# Patient Record
Sex: MALE | Race: WHITE | NOT HISPANIC OR LATINO | URBAN - METROPOLITAN AREA
[De-identification: names, ages, dates, MRNs, and addresses within clinical notes are randomized per-mention and may not be internally consistent; named-entity substitution may affect disease eponyms.]

---

## 2024-05-20 ENCOUNTER — OFFICE VISIT (OUTPATIENT)
Age: 39
End: 2024-05-20
Payer: MEDICARE

## 2024-05-20 VITALS
HEIGHT: 67 IN | HEART RATE: 57 BPM | BODY MASS INDEX: 32.02 KG/M2 | WEIGHT: 204 LBS | DIASTOLIC BLOOD PRESSURE: 56 MMHG | SYSTOLIC BLOOD PRESSURE: 131 MMHG

## 2024-05-20 DIAGNOSIS — S69.91XA INJURY OF RIGHT HAND, INITIAL ENCOUNTER: ICD-10-CM

## 2024-05-20 DIAGNOSIS — S62.316A DISPLACED FRACTURE OF BASE OF FIFTH METACARPAL BONE, RIGHT HAND, INITIAL ENCOUNTER FOR CLOSED FRACTURE: Primary | ICD-10-CM
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
## 2024-05-20 PROCEDURE — 99204 OFFICE O/P NEW MOD 45 MIN: CPT | Performed by: STUDENT IN AN ORGANIZED HEALTH CARE EDUCATION/TRAINING PROGRAM

## 2024-05-20 PROCEDURE — 26600 TREAT METACARPAL FRACTURE: CPT | Performed by: STUDENT IN AN ORGANIZED HEALTH CARE EDUCATION/TRAINING PROGRAM

## 2024-05-20 NOTE — PROGRESS NOTES
"1. Displaced fracture of base of fifth metacarpal bone, right hand, initial encounter for closed fracture  Fracture / Dislocation Treatment      2. Injury of right hand, initial encounter  Fracture / Dislocation Treatment      3. Struck by softball, initial encounter  Fracture / Dislocation Treatment        Orders Placed This Encounter   Procedures    Fracture / Dislocation Treatment        Imaging Studies (I personally reviewed images in PACS and report):    X-ray right hand 5/17/2024: There is no official report available to review but by my interpretation there appears to be a minimally displaced/angulated fifth metacarpal neck fracture.    IMPRESSION:  Acute right ulnar-sided hand pain/injury secondary to a mildly/angulated displaced fifth metacarpal neck fracture  Secondary to injury while playing softball and striking the ulnar aspect of his hand while attempting to make a catch  Currently in a splint  Date of Injury: 5/17/2024    PLAN:    Clinical exam and radiographic imaging reviewed with patient today, with impression as per above. I have discussed with the patient the pathophysiology of this diagnosis and reviewed how the examination correlates with this diagnosis.    Imaging obtained/reviewed as per above. I will follow up official radiology interpretation.  Recommended conservative treatment at this time.  Transition patient from a ulnar gutter splint to an ulnar gutter cast as per procedure note below.  Cast care discussed as per patient instructions.  Counseled he would need to be in the cast for at least 6 weeks.  In regards to pain control I counseled use of acetaminophen, NSAIDs, icing 20 minutes on/off, elevation of the affected extremity.    Return in about 5 weeks (around 6/24/2024).      Portions of the record may have been created with voice recognition software. Occasional wrong word or \"sound a like\" substitutions may have occurred due to the inherent limitations of voice recognition " software. Read the chart carefully and recognize, using context, where substitutions have occurred.     CHIEF COMPLAINT:  Chief Complaint   Patient presents with    Right Hand - Pain         HPI:  David Zazueta is a 38 y.o. male  who presents with his daughter for       Visit 5/20/2024 :  Initial evaluation of right hand fracture/injury:  Was seen at [facility] (imaging report/documents from  not available. Patient has CD available which was scanned into chart today and reviewed)    Injury Related: Yes     Onset/Mechanism: 5/17/2024.  Reportedly was participating in softball and while attempting to make a catch was struck awkwardly along the ulnar aspect of his hand causing immediate pain.  He states he was able to complete the game but had persistent pain along the ulnar aspect of his hand and eventually developed progressive swelling and difficulty with gripping due to the pain.  Location: Points to the fifth metacarpal head/neck as the site of pain  Severity: Intermittent, mild to moderate intensity.  Patient states when the splint was first applied at urgent care they did apply it tight enough that patient actually felt more discomfort and did reapply to apply less pressure along the fifth metacarpal head/neck and now the pain is more tolerable.  Pain described as: Sharp/aching  Radiation: None  Provocative: Gripping, lifting  Associated symptoms: Denies any numbness/tingling of right upper extremity     Hx fracture/injury for affected limb: Denies  Surgery hx for affected limb: Denies     Summary of treatment to-date:   Tylenol / NSAIDs   ICE / elevate   Ulnar gutter splint      Medical, Surgical, Family, and Social History    History reviewed. No pertinent past medical history.  History reviewed. No pertinent surgical history.  Social History   Social History     Substance and Sexual Activity   Alcohol Use Yes     Social History     Substance and Sexual Activity   Drug Use Never     Social History  "    Tobacco Use   Smoking Status Never   Smokeless Tobacco Never     History reviewed. No pertinent family history.  No Known Allergies       Physical Exam  /56   Pulse 57   Ht 5' 7\" (1.702 m)   Wt 92.5 kg (204 lb)   BMI 31.95 kg/m²     Constitutional:  see vital signs  Gen: well-developed, normocephalic/atraumatic, well-groomed  Eyes: No inflammation or discharge of conjunctiva or lids; sclera clear   Pharynx: no inflammation, lesion, or mass of lips  SKIN: no visible rashes or skin lesions  Pulmonary/Chest: Effort normal. No respiratory distress.     Ortho Exam  Right hand exam:  Ulnar gutter splint was removed for examination  Inspection: Skin edema along the ulnar aspect of his hand.  Faded ecchymosis.  No open wounds or drainage, erythema  Palpation: Positive tenderness of the fifth metacarpal head and neck otherwise nontender throughout the fifth metacarpal shaft or fifth digit/PIP/DIP.  Nontender over his other digits or thumb.  Nontender over the radial/ulnar aspect of his wrist  ROM: Intact ROM of his lesser digits at the MCP/PIP/DIP without malrotation.  Intact opposition of his thumb.  Wrist range of motion limited due to reported stiffness of wrist but does not exacerbate pain of hand.  Wrist extension of about 30 degrees and wrist flexion of about 45 degrees  Sensation intact in radial/median/ulnar distribution  Radial pulse 2+    Fracture / Dislocation Treatment    Date/Time: 5/20/2024 8:00 AM    Performed by: Michaela Salgado  Authorized by: Santi Schwartz MD    Patient Location:  [facility] Protocol:  Consent: Verbal consent obtained.  Risks and benefits: risks, benefits and alternatives were discussed  Consent given by: patient  Patient understanding: patient states understanding of the procedure being performed  Radiology Images displayed and confirmed. If images not available, report reviewed: imaging studies available  Required items: required blood products, implants, devices, " and special equipment available    Injury location:  Hand  Location details:  Right hand  Injury type:  Fracture  Fracture type: fifth metacarpal    Distal perfusion: normal    Neurological function: normal    Range of motion: reduced    Immobilization:  Cast  Cast type: Ulnar gutter short arm cast.  Distal perfusion: normal    Neurological function: normal    Patient tolerance:  Patient tolerated the procedure well with no immediate complications

## 2024-06-27 ENCOUNTER — APPOINTMENT (OUTPATIENT)
Age: 39
End: 2024-06-27
Payer: MEDICARE

## 2024-06-27 ENCOUNTER — OFFICE VISIT (OUTPATIENT)
Age: 39
End: 2024-06-27
Payer: MEDICARE

## 2024-06-27 VITALS
DIASTOLIC BLOOD PRESSURE: 69 MMHG | BODY MASS INDEX: 32.49 KG/M2 | HEIGHT: 67 IN | SYSTOLIC BLOOD PRESSURE: 109 MMHG | WEIGHT: 207 LBS | HEART RATE: 52 BPM

## 2024-06-27 DIAGNOSIS — S62.336D CLOSED DISPLACED FRACTURE OF NECK OF FIFTH METACARPAL BONE OF RIGHT HAND WITH ROUTINE HEALING, SUBSEQUENT ENCOUNTER: Primary | ICD-10-CM

## 2024-06-27 DIAGNOSIS — M79.641 PAIN OF RIGHT HAND: ICD-10-CM

## 2024-06-27 PROCEDURE — 73130 X-RAY EXAM OF HAND: CPT

## 2024-06-27 PROCEDURE — 99213 OFFICE O/P EST LOW 20 MIN: CPT | Performed by: STUDENT IN AN ORGANIZED HEALTH CARE EDUCATION/TRAINING PROGRAM

## 2024-06-27 NOTE — PROGRESS NOTES
1. Closed displaced fracture of neck of fifth metacarpal bone of right hand with routine healing, subsequent encounter  Cock Up Wrist Splint      2. Pain of right hand  XR hand 3+ vw right    Cock Up Wrist Splint        Orders Placed This Encounter   Procedures    Cock Up Wrist Splint    XR hand 3+ vw right        Imaging Studies (I personally reviewed images in PACS and report):    X-ray right hand 6/27/2024: Redemonstrates mildly angulated distal fifth metacarpal fracture.  No significant callus formation.  On clinical exam, patient is nontender over the fracture though  X-ray right hand 5/17/2024: There is no official report available to review but by my interpretation there appears to be a minimally displaced/angulated fifth metacarpal neck fracture.    IMPRESSION:  Acute right ulnar-sided hand pain/injury secondary to a mildly/angulated displaced fifth metacarpal neck fracture  Secondary to injury while playing softball and striking the ulnar aspect of his hand while attempting to make a catch  Patient currently in ulnar gutter cast  Radiographic imaging shows stable fracture and clinically is nontender over fracture.  He has expected post immobilization stiffness of his fourth and fifth digits.  Reports tingling sensations of his fifth digit and ulnar aspect of his hand but otherwise intact sensation to palpation today  Date of Injury: 5/17/2024  FUI: 5 weeks, 6 days    PLAN:    Clinical exam and radiographic imaging reviewed with patient today, with impression as per above. I have discussed with the patient the pathophysiology of this diagnosis and reviewed how the examination correlates with this diagnosis.    Cast removed and repeat imaging of his right hand obtained today as noted above.  I will follow-up official radiology interpretation.  Counseled he can progressively return to using his hand as tolerated and home exercises were supplemented today.  Counseled daily adherence.  Counseled use of heat/ice  "therapy 20 minutes on/off, acetaminophen, NSAIDs as needed for pain.  There is consideration for referral to hand therapy if he does not progressively improve his hand range of motion and function.  I did  that as he progressively improves the range of motion of his fingers to monitor for potential malrotation or digital scissoring as this could be an indicator that the fracture may have healed in a place that can cause a deformity and difficulty with gripping if there is digital scissoring.  Furthermore, I prescribed him a universal wrist brace today as it does help protect the location of his fracture and I recommended use of this brace for the next 2 weeks during sports activities.    Return if symptoms worsen or fail to improve.      Portions of the record may have been created with voice recognition software. Occasional wrong word or \"sound a like\" substitutions may have occurred due to the inherent limitations of voice recognition software. Read the chart carefully and recognize, using context, where substitutions have occurred.     CHIEF COMPLAINT:  No chief complaint on file.        HPI:  David Zazueta is a 38 y.o. male  who presents for       Visit 6/27/2024:  Follow-up evaluation of right fifth metacarpal fracture status post casting  Patient has remained adherent to casting.  Patient actually notes that he had continued to play softball with his company while using the cast.  Denies any new injuries of his right hand while in the cast.  Overall denies any pain while in the cast.  He did state there is some tingling sensations over the ulnar aspect of his hand and fifth digit intermittently.  Otherwise, denies use of any pain medications for his right hand.    Prior visit 5/20/2024 :  Initial evaluation of right hand fracture/injury:  Was seen at [facility] (imaging report/documents from  not available. Patient has CD available which was scanned into chart today and reviewed)    Injury Related: " "Yes     Onset/Mechanism: 5/17/2024.  Reportedly was participating in softball and while attempting to make a catch was struck awkwardly along the ulnar aspect of his hand causing immediate pain.  He states he was able to complete the game but had persistent pain along the ulnar aspect of his hand and eventually developed progressive swelling and difficulty with gripping due to the pain.  Location: Points to the fifth metacarpal head/neck as the site of pain  Severity: Intermittent, mild to moderate intensity.  Patient states when the splint was first applied at urgent care they did apply it tight enough that patient actually felt more discomfort and did reapply to apply less pressure along the fifth metacarpal head/neck and now the pain is more tolerable.  Pain described as: Sharp/aching  Radiation: None  Provocative: Gripping, lifting  Associated symptoms: Denies any numbness/tingling of right upper extremity     Hx fracture/injury for affected limb: Denies  Surgery hx for affected limb: Denies     Summary of treatment to-date:   Tylenol / NSAIDs   ICE / elevate   Ulnar gutter splint      Medical, Surgical, Family, and Social History    History reviewed. No pertinent past medical history.  History reviewed. No pertinent surgical history.  Social History   Social History     Substance and Sexual Activity   Alcohol Use Yes     Social History     Substance and Sexual Activity   Drug Use Never     Social History     Tobacco Use   Smoking Status Never   Smokeless Tobacco Never     History reviewed. No pertinent family history.  No Known Allergies       Physical Exam  /69   Pulse (!) 52   Ht 5' 7\" (1.702 m)   Wt 93.9 kg (207 lb)   BMI 32.42 kg/m²     Constitutional:  see vital signs  Gen: well-developed, normocephalic/atraumatic, well-groomed  Eyes: No inflammation or discharge of conjunctiva or lids; sclera clear   Pharynx: no inflammation, lesion, or mass of lips  SKIN: no visible rashes or skin " lesions  Pulmonary/Chest: Effort normal. No respiratory distress.     Ortho Exam  Right hand exam:  Cast removed for examination  Inspection: There is dried/flaking skin but otherwise no edema, erythema, open wounds or drainage.    Palpation: Nontender throughout his hand specifically nontender over his fifth metacarpal at the site of his fracture  Sensation: Intact in ulnar/median/radial distribution  ROM: Significantly limited but intact ROM at the MCP/PIP/DIP of the fourth and fifth digits.  I am unable to determine malrotation or digital scissoring.  Other digits have full ROM and there is intact opposition with his thumb  Radial pulse 2+      Procedures

## 2025-04-06 ENCOUNTER — OFFICE VISIT (OUTPATIENT)
Age: 40
End: 2025-04-06
Payer: COMMERCIAL

## 2025-04-06 VITALS
HEART RATE: 85 BPM | DIASTOLIC BLOOD PRESSURE: 84 MMHG | HEIGHT: 67 IN | RESPIRATION RATE: 16 BRPM | OXYGEN SATURATION: 98 % | WEIGHT: 215 LBS | SYSTOLIC BLOOD PRESSURE: 124 MMHG | TEMPERATURE: 98.7 F | BODY MASS INDEX: 33.74 KG/M2

## 2025-04-06 DIAGNOSIS — J06.9 VIRAL URI WITH COUGH: Primary | ICD-10-CM

## 2025-04-06 DIAGNOSIS — H65.03 NON-RECURRENT ACUTE SEROUS OTITIS MEDIA OF BOTH EARS: ICD-10-CM

## 2025-04-06 PROCEDURE — S9083 URGENT CARE CENTER GLOBAL: HCPCS | Performed by: EMERGENCY MEDICINE

## 2025-04-06 PROCEDURE — G0382 LEV 3 HOSP TYPE B ED VISIT: HCPCS | Performed by: EMERGENCY MEDICINE

## 2025-04-06 RX ORDER — PREDNISONE 10 MG/1
TABLET ORAL
Qty: 21 TABLET | Refills: 0 | Status: SHIPPED | OUTPATIENT
Start: 2025-04-06

## 2025-04-06 NOTE — PROGRESS NOTES
[facility]        NAME: David Zazueta is a 39 y.o. male  : 1985    MRN: [id number]  DATE: 2025  TIME: 8:46 AM    Assessment and Plan   Viral URI with cough [J06.9]  1. Viral URI with cough  predniSONE 10 mg tablet      2. Non-recurrent acute serous otitis media of both ears  predniSONE 10 mg tablet            Patient Instructions     Patient Instructions    URI    You have been diagnosed with a Viral Upper Respiratory infection and  your symptoms should resolve over the next 7 to 10 days with the treatments recommended today.  If they do not, it is possible that you have developed a bacterial infection and you should return. If you were to take an antibiotic while you are still in the viral stage, you will not get better any faster, but could kill off many of the good germs in your body as well as make the germs in you resistant to the antibiotic.  Take an expectorant - guaifenesin should be the only ingredient - during the day, and a cough suppressant (ex. Robitussin DM or Tessalon) if needed at night only.   Take Zinc 25 mg every 12 hours for the next week (the dose is important so do not just take a multivitamin with zinc or an over-the-counter cold med with zinc such as Airborne or Zicam, as that will not give you the sufficient dose).    You should also take Vitamin D3 5000 i.u.s per day for the next 1 week, and Vitamin-C 1 g twice daily (and again dosages are important, do not think you are getting enough vitamin C just by drinking extra orange juice).  You may use Flonase as discussed.  You may also take a decongestant like Sudafed, unless you have hypertension or cardiac disease. Avoid nasal sprays like Afrin or other vasoconstrictors.  If you are diabetic you should adhere strictly to your diabetic diet and monitor blood sugar closely while on prednisone and you should discontinue the prednisone if blood sugar becomes significantly elevated.  Avoid nonsteroidal anti-inflammatories  like Advil or Aleve while on prednisone.   Although bothersome, mucous is not necessarily a bad thing. Production of mucous is the body's way of trying to capture and flush irritants from mucosal surfaces. Yellow or green mucous does not necessarily mean you have a bacterial infection. Mucous will become more discolored over time, especially first thing in the morning, as your body's immune system floods the mucosal surfaces with white bloods cells to try and help fight infection. This white blood cell debride can also cause mucous to be discolored. Again, using nasal saline spray frequently may help soothe and keep mucous flowing out versus getting dried, thickened and / or stuck leading to more sinus pain and pressure.      If you have a cough, please realize that a cough is not necessarily a bad thing. It is a part of your body's protection mechanism to help keep your airways clear. Phlegm may be more discolored in the morning. Please note that discolored phlegm does not necessarily mean a bacterial infection.      Upper Respiratory Infection   AMBULATORY CARE:   An upper respiratory infection  is also called a cold. Your nose, throat, ears, and sinuses may be affected. You are more likely to get a cold in the winter. Your risk of getting a cold may be increased if you smoke cigarettes or have allergies, such as hay fever.  What causes a cold?  A cold is caused by a virus. Many viruses can cause a cold, and each is contagious. This means the virus can be easily spread to another person when the sick person coughs or sneezes. The virus can also be spread if you touch an object the virus is on and then touch your eyes, mouth, or nose.  Cold symptoms  are usually worst for the first 3 to 5 days. You may have any of the following:  Runny or stuffy nose    Sneezing and coughing    Sore throat or hoarseness    Red, watery, and sore eyes    Fatigue (you feel more tired than usual)    Chills and fever    Headache, body  aches, or sore muscles    Call your local emergency number (911 in the [place]) if:   You have chest pain or trouble breathing.      Seek care immediately if:   You have a fever over 102ºF (39ºC).      Call your doctor if:   You have a low fever.    Your sore throat gets worse or you see white or yellow spots in your throat.    Your symptoms get worse after 3 to 5 days or are not better in 14 days.    You have a rash anywhere on your skin.    You have large, tender lumps in your neck.    You have thick, green, or yellow drainage from your nose.    You cough up thick yellow, green, or bloody mucus.    You have a bad earache.    You have questions or concerns about your condition or care.    Treatment:  Colds are caused by viruses and do not get better with antibiotics. Most people get better in 7 to 14 days. You may continue to cough for 2 to 3 weeks. The following may help decrease your symptoms:  Decongestants  help reduce nasal congestion and help you breathe more easily. If you take decongestant pills, they may make you feel restless or not able to sleep. Do not use decongestant sprays for more than a few days.    Cough suppressants  help reduce coughing. Ask your healthcare provider which type of cough medicine is best for you.     NSAIDs , such as ibuprofen, help decrease swelling, pain, and fever. NSAIDs can cause stomach bleeding or kidney problems in certain people. If you take blood thinner medicine, always ask your healthcare provider if NSAIDs are safe for you. Always read the medicine label and follow directions.    Acetaminophen  decreases pain and fever. It is available without a doctor's order. Ask how much to take and how often to take it. Follow directions. Read the labels of all other medicines you are using to see if they also contain acetaminophen, or ask your doctor or pharmacist. Acetaminophen can cause liver damage if not taken correctly. Do not use more than 4 grams (4,000 milligrams) total of  acetaminophen in one day.    Manage a cold:   Rest as much as possible.  Slowly start to do more each day.    Drink more liquids as directed.  Liquids will help thin and loosen mucus so you can cough it up. Liquids will also help prevent dehydration. Liquids that help prevent dehydration include water, fruit juice, and broth. Do not drink liquids that contain caffeine. Caffeine can increase your risk for dehydration. Ask your healthcare provider how much liquid to drink each day.    Soothe a sore throat.  Gargle with warm salt water. Make salt water by dissolving ¼ teaspoon salt in 1 cup warm water. You may also suck on hard candy or throat lozenges. You may use a sore throat spray.    Use a humidifier or vaporizer.  Use a cool mist humidifier or a vaporizer to increase air moisture in your home. This may make it easier for you to breathe and help decrease your cough.    Use saline nasal drops as directed.  These help relieve congestion.    Apply petroleum-based jelly around the outside of your nostrils.  This can decrease irritation from blowing your nose.    Do not smoke.  Nicotine and other chemicals in cigarettes and cigars can make your symptoms worse. They can also cause infections such as bronchitis or pneumonia. Ask your healthcare provider for information if you currently smoke and need help to quit. E-cigarettes or smokeless tobacco still contain nicotine. Talk to your healthcare provider before you use these products.    Prevent a cold:   Wash your hands often.  Use soap and water every time you wash your hands. Rub your soapy hands together, lacing your fingers. Use the fingers of one hand to scrub under the nails of the other hand. Wash for at least 20 seconds. Rinse with warm, running water for several seconds. Then dry your hands. Use germ-killing gel if soap and water are not available. Do not touch your eyes or mouth without washing your hands first.     Cover a sneeze or cough.  Use a tissue that  covers your mouth and nose. Put the used tissue in the trash right away. Use the bend of your arm if a tissue is not available. Wash your hands well with soap and water or use a hand . Do not stand close to anyone who is sneezing or coughing.    Try to stay away from others while you are sick.  This is especially important during the first 2 to 3 days when the virus is more easily spread. Wait until a fever, cough, or other symptoms are gone before you return to work or other regular activities.    Do not share items while you are sick.  This includes food, drinks, eating utensils, and dishes.    Follow up with your doctor as directed:  Write down your questions so you remember to ask them during your visits.  © Copyright [employer] 2022 Information is for End User's use only and may not be sold, redistributed or otherwise used for commercial purposes. All illustrations and images included in CareNotes® are the copyrighted property of [employer] or [employer]  The above information is an  only. It is not intended as medical advice for individual conditions or treatments. Talk to your doctor, nurse or pharmacist before following any medical regimen to see if it is safe and effective for you.  Patient Education     Laryngitis   The Basics   Written by the doctors and editors at [facility]   What is laryngitis? -- This is inflammation of the vocal cords. It usually causes the voice to sound hoarse. It can even make someone lose their voice completely.  What causes laryngitis? -- It can be caused by:   The common cold and other infections that affect the throat   Shouting or straining your voice too much   Breathing in harsh chemicals, such as  or gasoline   Drinking too much alcohol or smoking   Acid reflux, which is when the acid from your stomach backs up into your throat  There are also medical problems besides laryngitis that can make your voice hoarse or make you lose  "your voice. For example, people can have these symptoms because of:   Abnormal growths on the vocal cords   Muscle disorders affecting the voice box   Cancer of the throat  What can I do on my own to feel better? -- There are different things you can do, depending on what caused your laryngitis.   If your laryngitis happened because you strained your voice too much, give your voice a rest. If you are a sears or need to use your voice for work, you might want to think about taking voice lessons. An experienced teacher can help you learn how to protect your voice and prevent straining it again.   If your laryngitis was caused by drinking alcohol, limit how much you drink. If it was related to smoking, the best thing you can do is to quit smoking. Your doctor or nurse can help you.   If your laryngitis was caused by breathing in a harsh chemical, avoid the chemical if possible. If you need to be around fumes, make sure that there is a lot of fresh air coming in and wear a \"respirator\" mask. If you work near chemical fumes that are making you hoarse, speak with your employer about getting masks and ventilation fans.   If your laryngitis was caused by acid reflux, take steps to avoid acid reflux. For example:   Take medicines for acid reflux, if your doctor recommends them.   Avoid foods that make your symptoms worse. (Common examples include alcohol, coffee, and chocolate.)   If you smoke, stop smoking.   Eat many small meals each day, rather than 2 or 3 big meals.   Do not lie down for at least 3 hours after eating, particularly after a big meal.  Should I see a doctor or nurse? -- That depends on how long your symptoms last and whether you have symptoms besides hoarseness.  Most people with laryngitis get better on their own within 2 to 3 weeks. If your voice is hoarse or gone for 2 weeks or longer, and you do not seem to be getting better, see a doctor or nurse.  You should also see a doctor or nurse if you have a " sore throat and:   You have a fever of at least 101°F or 38.4°C.   Your throat pain is severe or does not start to improve within 5 to 7 days.  Will I need tests? -- Maybe. If your doctor or nurse is not sure what is causing your symptoms, you might need tests. For example, you might have a laryngoscopy, which is when the doctor puts a thin tube with a tiny camera in your throat to look at your voice box.  How is laryngitis treated? -- That depends on what is causing it. If your laryngitis is caused by a cold or other minor infection, you will probably not need any treatment. If something else is causing your laryngitis, you might need treatment, depending on the situation.  What if my child gets laryngitis? -- Some of the same things that cause laryngitis in adults can cause it in children, too. For instance, children can get laryngitis because of a throat infection or common cold, because of acid reflux, or because they strain their voice too much. But in children, sounding hoarse can have lots of other causes. For example, children sometimes develop bumps on their vocal cords or are born with problems affecting their voice box.  See a doctor or nurse right away if your child has trouble breathing or has pain or other symptoms that seem to be getting worse. You should also see a doctor or nurse if your child has laryngitis for more than 2 weeks, or if the laryngitis is getting worse or is making it hard for your child to interact with others. If your child has laryngitis or throat discomfort or pain that gets better and comes back, see a doctor or nurse. If your child is a baby, call the baby's doctor as soon as you notice symptoms. The doctor will tell you what to do.  All topics are updated as new evidence becomes available and our peer review process is complete.  This topic retrieved from [employer] on: Apr 17, 2024.  Topic 82188 Version 17.0  Release: 32.3.2 - C32.106  © 2024 Up[employer]Date, Inc[employer] and/or its  affiliates. All rights reserved.  Consumer Information Use and Disclaimer   Disclaimer: This generalized information is a limited summary of diagnosis, treatment, and/or medication information. It is not meant to be comprehensive and should be used as a tool to help the user understand and/or assess potential diagnostic and treatment options. It does NOT include all information about conditions, treatments, medications, side effects, or risks that may apply to a specific patient. It is not intended to be medical advice or a substitute for the medical advice, diagnosis, or treatment of a health care provider based on the health care provider's examination and assessment of a patient's specific and unique circumstances. Patients must speak with a health care provider for complete information about their health, medical questions, and treatment options, including any risks or benefits regarding use of medications. This information does not endorse any treatments or medications as safe, effective, or approved for treating a specific patient. UpToDate, Inc. and its affiliates disclaim any warranty or liability relating to this information or the use thereof.The use of this information is governed by the Terms of Use, available at https://www.[employer].com/en/know/clinical-effectiveness-terms. 2024© UpToDate, Inc. and its affiliates and/or licensors. All rights reserved.  Copyright   © 2024 Up[employer]Date, Inc. and/or its affiliates. All rights reserved.      Serous Otitis Media   About this topic   The Eustachian tube allows fluid to drain from the middle ear. Sometimes, fluid cannot drain from the tube. This may cause an acute or chronic problem known as serous otitis media. An acute problem is one that does not last for a long time. Acute serous media is often caused by an infection or allergy. A chronic problem is one that lasts for a long time. Chronic serous otitis media may happen when the tube stays blocked because  the fluid is too thick to drain from the tube.  This problem may go away on its own without treatment. If the fluid becomes infected, you may have ear infections more often. Your ears may feel like they are full and you may not be able to hear as well.     What are the causes?   Serous otitis media happens when something blocks the Eustachian tube. Sometimes, you are born with this problem. Other causes may include:  Infection  Allergy  Irritants like cigarette smoke  Drinking when lying down  Increase in air pressure like when flying  Enlarged adenoids  Cleft palate  What can make this more likely to happen?   Young children are more likely to have this problem. Kids get more colds. They have Eustachian tubes that are not as developed as those of an adult. Having many colds or allergies may make you more at risk. Having a problem you were born with may cause a block.  What are the main signs?   Trouble hearing  Ear fullness  Pain or pulling on the ear  Problems with balance  How does the doctor diagnose this health problem?   Your doctor will take your history. Your doctor will do an exam and check your ears with a special tool. The doctor will look for changes to the eardrum. The doctor may order:  Lab tests  Hearing tests  How does the doctor treat this health problem?   Your doctor will treat the problem based on what the cause is. Often, it is an acute problem that the doctor will monitor over time. Drugs often do not help. Surgery may be needed to remove chronic fluid. Ear tubes may be put in to open the Eustachian tube.  Are there other health problems to treat?   If enlarged adenoids are the problem, you may need surgery to remove them.  What drugs may be needed?   Your doctor may order drugs based on what problems you are having. The doctor may order drugs to:  Help with pain and swelling  Fight an infection   Treat an allergy  What problems could happen?   Infection could come back  Loss of  hearing  Problems with speech  Scarring on the eardrum  What can be done to prevent this health problem?   If you smoke, quit. Do not go near people who smoke.  Stay away from people who have colds.  If you have allergies, treat them, and try to avoid your triggers.  Wash your hands often.  If over 12 months of age, stop daytime use of a pacifier.  Last Reviewed Date   2020-08-05  Consumer Information Use and Disclaimer   This generalized information is a limited summary of diagnosis, treatment, and/or medication information. It is not meant to be comprehensive and should be used as a tool to help the user understand and/or assess potential diagnostic and treatment options. It does NOT include all information about conditions, treatments, medications, side effects, or risks that may apply to a specific patient. It is not intended to be medical advice or a substitute for the medical advice, diagnosis, or treatment of a health care provider based on the health care provider's examination and assessment of a patient’s specific and unique circumstances. Patients must speak with a health care provider for complete information about their health, medical questions, and treatment options, including any risks or benefits regarding use of medications. This information does not endorse any treatments or medications as safe, effective, or approved for treating a specific patient. UpToDate, Inc. and its affiliates disclaim any warranty or liability relating to this information or the use thereof. The use of this information is governed by the Terms of Use, available at https://www.[employer].com/en/know/clinical-effectiveness-terms   Copyright   Copyright © 2024 UpToDate, Inc. and its affiliates and/or licensors. All rights reserved.      Follow up with PCP in 3-5 days.  Proceed to  ER if symptoms worsen.    Chief Complaint     Chief Complaint   Patient presents with    Facial Pain     X1 week. Cough, face pressure, eye and  "ear pressure, and post nasal drip with sore throat and burning in chest with cough, lost voice since yesterday. Taking mucinex and ibuprofen.          History of Present Illness       Patient complains of cough and congestion for the past week now with bilateral ear fullness and hoarse voice over the past day.        Review of Systems   Review of Systems   Constitutional:  Negative for chills and fever.   HENT:  Positive for congestion, ear pain, rhinorrhea, sinus pressure and voice change. Negative for ear discharge and hearing loss.    Respiratory:  Positive for cough. Negative for chest tightness, shortness of breath and wheezing.    Gastrointestinal:  Negative for diarrhea and vomiting.   Musculoskeletal:  Negative for neck stiffness.   Skin:  Negative for pallor.   Neurological:  Negative for headaches.         Current Medications       Current Outpatient Medications:     predniSONE 10 mg tablet, Take once daily all day's pills on this schedule  6- 5- 4- 3- 2- 1, Disp: 21 tablet, Rfl: 0    Current Allergies     Allergies as of 04/06/2025    (No Known Allergies)            The following portions of the patient's history were reviewed and updated as appropriate: allergies, current medications, past family history, past medical history, past social history, past surgical history and problem list.     History reviewed. No pertinent past medical history.    Past Surgical History:   Procedure Laterality Date    ORIF TIBIA & FIBULA FRACTURES Right 2015    PATELLA SURGERY Bilateral        Family History   Problem Relation Age of Onset    Cancer Mother         breast    Heart disease Father     Heart attack Father          Medications have been verified.        Objective   /84 (Patient Position: Sitting)   Pulse 85   Temp 98.7 °F (37.1 °C) (Oral)   Resp 16   Ht 5' 7\" (1.702 m)   Wt 97.5 kg (215 lb)   SpO2 98%   BMI 33.67 kg/m²        Physical Exam     Physical Exam  Vitals and nursing note reviewed. "   Constitutional:       General: He is not in acute distress.     Appearance: Normal appearance. He is well-developed. He is not ill-appearing or diaphoretic.   HENT:      Head: Normocephalic and atraumatic.      Right Ear: Tympanic membrane, ear canal and external ear normal.      Left Ear: Tympanic membrane, ear canal and external ear normal.      Nose: Mucosal edema and congestion present. No rhinorrhea.      Mouth/Throat:      Pharynx: Posterior oropharyngeal erythema present.   Eyes:      Conjunctiva/sclera: Conjunctivae normal.      Pupils: Pupils are equal, round, and reactive to light.   Cardiovascular:      Rate and Rhythm: Normal rate and regular rhythm.      Heart sounds: Normal heart sounds.   Pulmonary:      Effort: Pulmonary effort is normal. No respiratory distress.      Breath sounds: Normal breath sounds. No wheezing, rhonchi or rales.   Musculoskeletal:      Cervical back: Neck supple.   Lymphadenopathy:      Cervical: No cervical adenopathy.   Skin:     General: Skin is warm and dry.      Coloration: Skin is not pale.      Findings: No rash.   Neurological:      Mental Status: He is alert and oriented to person, place, and time.   Psychiatric:         Mood and Affect: Mood normal.         Behavior: Behavior normal.         Thought Content: Thought content normal.         Judgment: Judgment normal.

## 2025-04-06 NOTE — PATIENT INSTRUCTIONS
URI    You have been diagnosed with a Viral Upper Respiratory infection and  your symptoms should resolve over the next 7 to 10 days with the treatments recommended today.  If they do not, it is possible that you have developed a bacterial infection and you should return. If you were to take an antibiotic while you are still in the viral stage, you will not get better any faster, but could kill off many of the good germs in your body as well as make the germs in you resistant to the antibiotic.  Take an expectorant - guaifenesin should be the only ingredient - during the day, and a cough suppressant (ex. Robitussin DM or Tessalon) if needed at night only.   Take Zinc 25 mg every 12 hours for the next week (the dose is important so do not just take a multivitamin with zinc or an over-the-counter cold med with zinc such as Airborne or Zicam, as that will not give you the sufficient dose).    You should also take Vitamin D3 5000 i.u.s per day for the next 1 week, and Vitamin-C 1 g twice daily (and again dosages are important, do not think you are getting enough vitamin C just by drinking extra orange juice).  You may use Flonase as discussed.  You may also take a decongestant like Sudafed, unless you have hypertension or cardiac disease. Avoid nasal sprays like Afrin or other vasoconstrictors.  If you are diabetic you should adhere strictly to your diabetic diet and monitor blood sugar closely while on prednisone and you should discontinue the prednisone if blood sugar becomes significantly elevated.  Avoid nonsteroidal anti-inflammatories like Advil or Aleve while on prednisone.   Although bothersome, mucous is not necessarily a bad thing. Production of mucous is the body's way of trying to capture and flush irritants from mucosal surfaces. Yellow or green mucous does not necessarily mean you have a bacterial infection. Mucous will become more discolored over time, especially first thing in the morning, as your  body's immune system floods the mucosal surfaces with white bloods cells to try and help fight infection. This white blood cell debride can also cause mucous to be discolored. Again, using nasal saline spray frequently may help soothe and keep mucous flowing out versus getting dried, thickened and / or stuck leading to more sinus pain and pressure.      If you have a cough, please realize that a cough is not necessarily a bad thing. It is a part of your body's protection mechanism to help keep your airways clear. Phlegm may be more discolored in the morning. Please note that discolored phlegm does not necessarily mean a bacterial infection.      Upper Respiratory Infection   AMBULATORY CARE:   An upper respiratory infection  is also called a cold. Your nose, throat, ears, and sinuses may be affected. You are more likely to get a cold in the winter. Your risk of getting a cold may be increased if you smoke cigarettes or have allergies, such as hay fever.  What causes a cold?  A cold is caused by a virus. Many viruses can cause a cold, and each is contagious. This means the virus can be easily spread to another person when the sick person coughs or sneezes. The virus can also be spread if you touch an object the virus is on and then touch your eyes, mouth, or nose.  Cold symptoms  are usually worst for the first 3 to 5 days. You may have any of the following:  Runny or stuffy nose    Sneezing and coughing    Sore throat or hoarseness    Red, watery, and sore eyes    Fatigue (you feel more tired than usual)    Chills and fever    Headache, body aches, or sore muscles    Call your local emergency number (911 in the [place]) if:   You have chest pain or trouble breathing.      Seek care immediately if:   You have a fever over 102ºF (39ºC).      Call your doctor if:   You have a low fever.    Your sore throat gets worse or you see white or yellow spots in your throat.    Your symptoms get worse after 3 to 5 days or are not  better in 14 days.    You have a rash anywhere on your skin.    You have large, tender lumps in your neck.    You have thick, green, or yellow drainage from your nose.    You cough up thick yellow, green, or bloody mucus.    You have a bad earache.    You have questions or concerns about your condition or care.    Treatment:  Colds are caused by viruses and do not get better with antibiotics. Most people get better in 7 to 14 days. You may continue to cough for 2 to 3 weeks. The following may help decrease your symptoms:  Decongestants  help reduce nasal congestion and help you breathe more easily. If you take decongestant pills, they may make you feel restless or not able to sleep. Do not use decongestant sprays for more than a few days.    Cough suppressants  help reduce coughing. Ask your healthcare provider which type of cough medicine is best for you.     NSAIDs , such as ibuprofen, help decrease swelling, pain, and fever. NSAIDs can cause stomach bleeding or kidney problems in certain people. If you take blood thinner medicine, always ask your healthcare provider if NSAIDs are safe for you. Always read the medicine label and follow directions.    Acetaminophen  decreases pain and fever. It is available without a doctor's order. Ask how much to take and how often to take it. Follow directions. Read the labels of all other medicines you are using to see if they also contain acetaminophen, or ask your doctor or pharmacist. Acetaminophen can cause liver damage if not taken correctly. Do not use more than 4 grams (4,000 milligrams) total of acetaminophen in one day.    Manage a cold:   Rest as much as possible.  Slowly start to do more each day.    Drink more liquids as directed.  Liquids will help thin and loosen mucus so you can cough it up. Liquids will also help prevent dehydration. Liquids that help prevent dehydration include water, fruit juice, and broth. Do not drink liquids that contain caffeine. Caffeine  can increase your risk for dehydration. Ask your healthcare provider how much liquid to drink each day.    Soothe a sore throat.  Gargle with warm salt water. Make salt water by dissolving ¼ teaspoon salt in 1 cup warm water. You may also suck on hard candy or throat lozenges. You may use a sore throat spray.    Use a humidifier or vaporizer.  Use a cool mist humidifier or a vaporizer to increase air moisture in your home. This may make it easier for you to breathe and help decrease your cough.    Use saline nasal drops as directed.  These help relieve congestion.    Apply petroleum-based jelly around the outside of your nostrils.  This can decrease irritation from blowing your nose.    Do not smoke.  Nicotine and other chemicals in cigarettes and cigars can make your symptoms worse. They can also cause infections such as bronchitis or pneumonia. Ask your healthcare provider for information if you currently smoke and need help to quit. E-cigarettes or smokeless tobacco still contain nicotine. Talk to your healthcare provider before you use these products.    Prevent a cold:   Wash your hands often.  Use soap and water every time you wash your hands. Rub your soapy hands together, lacing your fingers. Use the fingers of one hand to scrub under the nails of the other hand. Wash for at least 20 seconds. Rinse with warm, running water for several seconds. Then dry your hands. Use germ-killing gel if soap and water are not available. Do not touch your eyes or mouth without washing your hands first.     Cover a sneeze or cough.  Use a tissue that covers your mouth and nose. Put the used tissue in the trash right away. Use the bend of your arm if a tissue is not available. Wash your hands well with soap and water or use a hand . Do not stand close to anyone who is sneezing or coughing.    Try to stay away from others while you are sick.  This is especially important during the first 2 to 3 days when the virus is  more easily spread. Wait until a fever, cough, or other symptoms are gone before you return to work or other regular activities.    Do not share items while you are sick.  This includes food, drinks, eating utensils, and dishes.    Follow up with your doctor as directed:  Write down your questions so you remember to ask them during your visits.  © Copyright [employer] 2022 Information is for End User's use only and may not be sold, redistributed or otherwise used for commercial purposes. All illustrations and images included in CareNotes® are the copyrighted property of [employer] or [employer]  The above information is an  only. It is not intended as medical advice for individual conditions or treatments. Talk to your doctor, nurse or pharmacist before following any medical regimen to see if it is safe and effective for you.  Patient Education     Laryngitis   The Basics   Written by the doctors and editors at [facility]   What is laryngitis? -- This is inflammation of the vocal cords. It usually causes the voice to sound hoarse. It can even make someone lose their voice completely.  What causes laryngitis? -- It can be caused by:   The common cold and other infections that affect the throat   Shouting or straining your voice too much   Breathing in harsh chemicals, such as  or gasoline   Drinking too much alcohol or smoking   Acid reflux, which is when the acid from your stomach backs up into your throat  There are also medical problems besides laryngitis that can make your voice hoarse or make you lose your voice. For example, people can have these symptoms because of:   Abnormal growths on the vocal cords   Muscle disorders affecting the voice box   Cancer of the throat  What can I do on my own to feel better? -- There are different things you can do, depending on what caused your laryngitis.   If your laryngitis happened because you strained your voice too much, give your  "voice a rest. If you are a sears or need to use your voice for work, you might want to think about taking voice lessons. An experienced teacher can help you learn how to protect your voice and prevent straining it again.   If your laryngitis was caused by drinking alcohol, limit how much you drink. If it was related to smoking, the best thing you can do is to quit smoking. Your doctor or nurse can help you.   If your laryngitis was caused by breathing in a harsh chemical, avoid the chemical if possible. If you need to be around fumes, make sure that there is a lot of fresh air coming in and wear a \"respirator\" mask. If you work near chemical fumes that are making you hoarse, speak with your employer about getting masks and ventilation fans.   If your laryngitis was caused by acid reflux, take steps to avoid acid reflux. For example:   Take medicines for acid reflux, if your doctor recommends them.   Avoid foods that make your symptoms worse. (Common examples include alcohol, coffee, and chocolate.)   If you smoke, stop smoking.   Eat many small meals each day, rather than 2 or 3 big meals.   Do not lie down for at least 3 hours after eating, particularly after a big meal.  Should I see a doctor or nurse? -- That depends on how long your symptoms last and whether you have symptoms besides hoarseness.  Most people with laryngitis get better on their own within 2 to 3 weeks. If your voice is hoarse or gone for 2 weeks or longer, and you do not seem to be getting better, see a doctor or nurse.  You should also see a doctor or nurse if you have a sore throat and:   You have a fever of at least 101°F or 38.4°C.   Your throat pain is severe or does not start to improve within 5 to 7 days.  Will I need tests? -- Maybe. If your doctor or nurse is not sure what is causing your symptoms, you might need tests. For example, you might have a laryngoscopy, which is when the doctor puts a thin tube with a tiny camera in your " throat to look at your voice box.  How is laryngitis treated? -- That depends on what is causing it. If your laryngitis is caused by a cold or other minor infection, you will probably not need any treatment. If something else is causing your laryngitis, you might need treatment, depending on the situation.  What if my child gets laryngitis? -- Some of the same things that cause laryngitis in adults can cause it in children, too. For instance, children can get laryngitis because of a throat infection or common cold, because of acid reflux, or because they strain their voice too much. But in children, sounding hoarse can have lots of other causes. For example, children sometimes develop bumps on their vocal cords or are born with problems affecting their voice box.  See a doctor or nurse right away if your child has trouble breathing or has pain or other symptoms that seem to be getting worse. You should also see a doctor or nurse if your child has laryngitis for more than 2 weeks, or if the laryngitis is getting worse or is making it hard for your child to interact with others. If your child has laryngitis or throat discomfort or pain that gets better and comes back, see a doctor or nurse. If your child is a baby, call the baby's doctor as soon as you notice symptoms. The doctor will tell you what to do.  All topics are updated as new evidence becomes available and our peer review process is complete.  This topic retrieved from [employer] on: Apr 17, 2024.  Topic 39993 Version 17.0  Release: 32.3.2 - C32.106  © 2024 UpToDate, Inc. and/or its affiliates. All rights reserved.  Consumer Information Use and Disclaimer   Disclaimer: This generalized information is a limited summary of diagnosis, treatment, and/or medication information. It is not meant to be comprehensive and should be used as a tool to help the user understand and/or assess potential diagnostic and treatment options. It does NOT include all information  about conditions, treatments, medications, side effects, or risks that may apply to a specific patient. It is not intended to be medical advice or a substitute for the medical advice, diagnosis, or treatment of a health care provider based on the health care provider's examination and assessment of a patient's specific and unique circumstances. Patients must speak with a health care provider for complete information about their health, medical questions, and treatment options, including any risks or benefits regarding use of medications. This information does not endorse any treatments or medications as safe, effective, or approved for treating a specific patient. UpToDate, Inc. and its affiliates disclaim any warranty or liability relating to this information or the use thereof.The use of this information is governed by the Terms of Use, available at https://www.[employer].com/en/know/clinical-effectiveness-terms. 2024© UpToDate, Inc. and its affiliates and/or licensors. All rights reserved.  Copyright   © 2024 Up[employer]Date, Inc. and/or its affiliates. All rights reserved.      Serous Otitis Media   About this topic   The Eustachian tube allows fluid to drain from the middle ear. Sometimes, fluid cannot drain from the tube. This may cause an acute or chronic problem known as serous otitis media. An acute problem is one that does not last for a long time. Acute serous media is often caused by an infection or allergy. A chronic problem is one that lasts for a long time. Chronic serous otitis media may happen when the tube stays blocked because the fluid is too thick to drain from the tube.  This problem may go away on its own without treatment. If the fluid becomes infected, you may have ear infections more often. Your ears may feel like they are full and you may not be able to hear as well.     What are the causes?   Serous otitis media happens when something blocks the Eustachian tube. Sometimes, you are born with  this problem. Other causes may include:  Infection  Allergy  Irritants like cigarette smoke  Drinking when lying down  Increase in air pressure like when flying  Enlarged adenoids  Cleft palate  What can make this more likely to happen?   Young children are more likely to have this problem. Kids get more colds. They have Eustachian tubes that are not as developed as those of an adult. Having many colds or allergies may make you more at risk. Having a problem you were born with may cause a block.  What are the main signs?   Trouble hearing  Ear fullness  Pain or pulling on the ear  Problems with balance  How does the doctor diagnose this health problem?   Your doctor will take your history. Your doctor will do an exam and check your ears with a special tool. The doctor will look for changes to the eardrum. The doctor may order:  Lab tests  Hearing tests  How does the doctor treat this health problem?   Your doctor will treat the problem based on what the cause is. Often, it is an acute problem that the doctor will monitor over time. Drugs often do not help. Surgery may be needed to remove chronic fluid. Ear tubes may be put in to open the Eustachian tube.  Are there other health problems to treat?   If enlarged adenoids are the problem, you may need surgery to remove them.  What drugs may be needed?   Your doctor may order drugs based on what problems you are having. The doctor may order drugs to:  Help with pain and swelling  Fight an infection   Treat an allergy  What problems could happen?   Infection could come back  Loss of hearing  Problems with speech  Scarring on the eardrum  What can be done to prevent this health problem?   If you smoke, quit. Do not go near people who smoke.  Stay away from people who have colds.  If you have allergies, treat them, and try to avoid your triggers.  Wash your hands often.  If over 12 months of age, stop daytime use of a pacifier.  Last Reviewed Date   2020-08-05  Consumer  Information Use and Disclaimer   This generalized information is a limited summary of diagnosis, treatment, and/or medication information. It is not meant to be comprehensive and should be used as a tool to help the user understand and/or assess potential diagnostic and treatment options. It does NOT include all information about conditions, treatments, medications, side effects, or risks that may apply to a specific patient. It is not intended to be medical advice or a substitute for the medical advice, diagnosis, or treatment of a health care provider based on the health care provider's examination and assessment of a patient’s specific and unique circumstances. Patients must speak with a health care provider for complete information about their health, medical questions, and treatment options, including any risks or benefits regarding use of medications. This information does not endorse any treatments or medications as safe, effective, or approved for treating a specific patient. UpToDate, Inc. and its affiliates disclaim any warranty or liability relating to this information or the use thereof. The use of this information is governed by the Terms of Use, available at https://www.[facility].com/en/know/clinical-effectiveness-terms   Copyright   Copyright © 2024 Up[employer]Date, Inc. and its affiliates and/or licensors. All rights reserved.